# Patient Record
Sex: FEMALE | Race: WHITE | Employment: UNEMPLOYED | URBAN - METROPOLITAN AREA
[De-identification: names, ages, dates, MRNs, and addresses within clinical notes are randomized per-mention and may not be internally consistent; named-entity substitution may affect disease eponyms.]

---

## 2019-05-22 ENCOUNTER — APPOINTMENT (OUTPATIENT)
Dept: CT IMAGING | Age: 84
End: 2019-05-22
Payer: MEDICARE

## 2019-05-22 ENCOUNTER — APPOINTMENT (OUTPATIENT)
Dept: GENERAL RADIOLOGY | Age: 84
End: 2019-05-22
Payer: MEDICARE

## 2019-05-22 ENCOUNTER — HOSPITAL ENCOUNTER (EMERGENCY)
Age: 84
Discharge: NURSING, REHAB OR LONG-TERM CARE | End: 2019-05-22
Payer: MEDICARE

## 2019-05-22 VITALS
WEIGHT: 144.18 LBS | TEMPERATURE: 98.5 F | SYSTOLIC BLOOD PRESSURE: 160 MMHG | OXYGEN SATURATION: 96 % | RESPIRATION RATE: 11 BRPM | DIASTOLIC BLOOD PRESSURE: 74 MMHG | BODY MASS INDEX: 23.99 KG/M2 | HEART RATE: 68 BPM

## 2019-05-22 DIAGNOSIS — S09.90XA CLOSED HEAD INJURY, INITIAL ENCOUNTER: ICD-10-CM

## 2019-05-22 DIAGNOSIS — S05.11XA ORBITAL CONTUSION, RIGHT, INITIAL ENCOUNTER: ICD-10-CM

## 2019-05-22 DIAGNOSIS — N39.0 URINARY TRACT INFECTION WITH HEMATURIA, SITE UNSPECIFIED: ICD-10-CM
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
## 2019-05-22 DIAGNOSIS — R31.9 URINARY TRACT INFECTION WITH HEMATURIA, SITE UNSPECIFIED: ICD-10-CM

## 2019-05-22 LAB
A/G RATIO: 0.9 (ref 1.1–2.2)
ALBUMIN SERPL-MCNC: 3.4 G/DL (ref 3.4–5)
ALP BLD-CCNC: 116 U/L (ref 40–129)
ALT SERPL-CCNC: 24 U/L (ref 10–40)
ANION GAP SERPL CALCULATED.3IONS-SCNC: 8 MMOL/L (ref 3–16)
AST SERPL-CCNC: 23 U/L (ref 15–37)
BACTERIA: ABNORMAL /HPF
BASOPHILS ABSOLUTE: 0 K/UL (ref 0–0.2)
BASOPHILS RELATIVE PERCENT: 0.4 %
BILIRUB SERPL-MCNC: 0.3 MG/DL (ref 0–1)
BILIRUBIN URINE: NEGATIVE
BLOOD, URINE: ABNORMAL
BUN BLDV-MCNC: 33 MG/DL (ref 7–20)
CALCIUM SERPL-MCNC: 10.1 MG/DL (ref 8.3–10.6)
CHLORIDE BLD-SCNC: 107 MMOL/L (ref 99–110)
CLARITY: ABNORMAL
CO2: 30 MMOL/L (ref 21–32)
COLOR: YELLOW
COMMENT UA: ABNORMAL
CREAT SERPL-MCNC: 0.8 MG/DL (ref 0.6–1.2)
EOSINOPHILS ABSOLUTE: 0.1 K/UL (ref 0–0.6)
EOSINOPHILS RELATIVE PERCENT: 2 %
EPITHELIAL CELLS, UA: 0 /HPF (ref 0–5)
GFR AFRICAN AMERICAN: >60
GFR NON-AFRICAN AMERICAN: >60
GLOBULIN: 3.8 G/DL
GLUCOSE BLD-MCNC: 81 MG/DL (ref 70–99)
GLUCOSE URINE: NEGATIVE MG/DL
HCT VFR BLD CALC: 33.5 % (ref 36–48)
HEMOGLOBIN: 11 G/DL (ref 12–16)
HYALINE CASTS: 1 /LPF (ref 0–8)
KETONES, URINE: NEGATIVE MG/DL
LEUKOCYTE ESTERASE, URINE: ABNORMAL
LYMPHOCYTES ABSOLUTE: 0.7 K/UL (ref 1–5.1)
LYMPHOCYTES RELATIVE PERCENT: 12.2 %
MCH RBC QN AUTO: 29.9 PG (ref 26–34)
MCHC RBC AUTO-ENTMCNC: 32.8 G/DL (ref 31–36)
MCV RBC AUTO: 90.9 FL (ref 80–100)
MICROSCOPIC EXAMINATION: YES
MONOCYTES ABSOLUTE: 0.6 K/UL (ref 0–1.3)
MONOCYTES RELATIVE PERCENT: 11.5 %
NEUTROPHILS ABSOLUTE: 4.1 K/UL (ref 1.7–7.7)
NEUTROPHILS RELATIVE PERCENT: 73.9 %
NITRITE, URINE: POSITIVE
PDW BLD-RTO: 17.9 % (ref 12.4–15.4)
PH UA: 6.5 (ref 5–8)
PLATELET # BLD: 141 K/UL (ref 135–450)
PMV BLD AUTO: 8.4 FL (ref 5–10.5)
POTASSIUM SERPL-SCNC: 4.6 MMOL/L (ref 3.5–5.1)
PROTEIN UA: ABNORMAL MG/DL
RBC # BLD: 3.69 M/UL (ref 4–5.2)
RBC UA: 32 /HPF (ref 0–4)
SODIUM BLD-SCNC: 145 MMOL/L (ref 136–145)
SPECIFIC GRAVITY UA: 1.02 (ref 1–1.03)
TOTAL PROTEIN: 7.2 G/DL (ref 6.4–8.2)
URINE REFLEX TO CULTURE: YES
URINE TYPE: ABNORMAL
UROBILINOGEN, URINE: 0.2 E.U./DL
WBC # BLD: 5.5 K/UL (ref 4–11)
WBC UA: 272 /HPF (ref 0–5)

## 2019-05-22 PROCEDURE — 73030 X-RAY EXAM OF SHOULDER: CPT

## 2019-05-22 PROCEDURE — 85025 COMPLETE CBC W/AUTO DIFF WBC: CPT

## 2019-05-22 PROCEDURE — 87086 URINE CULTURE/COLONY COUNT: CPT

## 2019-05-22 PROCEDURE — 72125 CT NECK SPINE W/O DYE: CPT

## 2019-05-22 PROCEDURE — 70450 CT HEAD/BRAIN W/O DYE: CPT

## 2019-05-22 PROCEDURE — 87077 CULTURE AEROBIC IDENTIFY: CPT

## 2019-05-22 PROCEDURE — 81001 URINALYSIS AUTO W/SCOPE: CPT

## 2019-05-22 PROCEDURE — 99284 EMERGENCY DEPT VISIT MOD MDM: CPT

## 2019-05-22 PROCEDURE — 70480 CT ORBIT/EAR/FOSSA W/O DYE: CPT

## 2019-05-22 PROCEDURE — 87186 SC STD MICRODIL/AGAR DIL: CPT

## 2019-05-22 PROCEDURE — 80053 COMPREHEN METABOLIC PANEL: CPT

## 2019-05-22 PROCEDURE — 73502 X-RAY EXAM HIP UNI 2-3 VIEWS: CPT

## 2019-05-22 RX ORDER — CHOLECALCIFEROL (VITAMIN D3) 1250 MCG
50000 CAPSULE ORAL
COMMUNITY

## 2019-05-22 RX ORDER — SULFAMETHOXAZOLE AND TRIMETHOPRIM 800; 160 MG/1; MG/1
1 TABLET ORAL 2 TIMES DAILY
Qty: 10 TABLET | Refills: 0 | Status: SHIPPED | OUTPATIENT
Start: 2019-05-22 | End: 2019-05-27

## 2019-05-22 RX ORDER — ACETAMINOPHEN 325 MG/1
650 TABLET ORAL 2 TIMES DAILY PRN
COMMUNITY

## 2019-05-22 RX ORDER — POLYETHYLENE GLYCOL 3350 17 G/17G
17 POWDER, FOR SOLUTION ORAL DAILY PRN
COMMUNITY

## 2019-05-22 RX ORDER — AMMONIUM LACTATE 12 G/100G
LOTION TOPICAL PRN
COMMUNITY

## 2019-05-22 RX ORDER — POLYETHYLENE GLYCOL 3350 17 G/17G
17 POWDER, FOR SOLUTION ORAL
COMMUNITY

## 2019-05-22 RX ORDER — AMOXICILLIN 250 MG
2 CAPSULE ORAL NIGHTLY
COMMUNITY

## 2019-05-22 RX ORDER — ASPIRIN 81 MG/1
81 TABLET, CHEWABLE ORAL DAILY
COMMUNITY

## 2019-05-22 RX ORDER — ACETAMINOPHEN 325 MG/1
650 TABLET ORAL 4 TIMES DAILY
COMMUNITY

## 2019-05-22 RX ORDER — ATORVASTATIN CALCIUM 10 MG/1
10 TABLET, FILM COATED ORAL NIGHTLY
COMMUNITY

## 2019-05-22 ASSESSMENT — PAIN DESCRIPTION - DESCRIPTORS
DESCRIPTORS: CONSTANT
DESCRIPTORS: CONSTANT

## 2019-05-22 ASSESSMENT — PAIN DESCRIPTION - ORIENTATION
ORIENTATION: RIGHT
ORIENTATION: RIGHT

## 2019-05-22 ASSESSMENT — ENCOUNTER SYMPTOMS
EYE DISCHARGE: 0
VOMITING: 0
APNEA: 0
BACK PAIN: 0
SHORTNESS OF BREATH: 0
CHOKING: 0
FACIAL SWELLING: 0
NAUSEA: 0
EYE REDNESS: 0
SORE THROAT: 0
ABDOMINAL PAIN: 0

## 2019-05-22 ASSESSMENT — PAIN - FUNCTIONAL ASSESSMENT: PAIN_FUNCTIONAL_ASSESSMENT: ACTIVITIES ARE NOT PREVENTED

## 2019-05-22 ASSESSMENT — PAIN DESCRIPTION - FREQUENCY
FREQUENCY: CONTINUOUS
FREQUENCY: CONTINUOUS

## 2019-05-22 ASSESSMENT — PAIN SCALES - GENERAL
PAINLEVEL_OUTOF10: 2
PAINLEVEL_OUTOF10: 5

## 2019-05-22 ASSESSMENT — PAIN DESCRIPTION - LOCATION
LOCATION: EYE
LOCATION: EYE

## 2019-05-22 ASSESSMENT — PAIN DESCRIPTION - ONSET: ONSET: SUDDEN

## 2019-05-22 ASSESSMENT — PAIN DESCRIPTION - PAIN TYPE
TYPE: ACUTE PAIN
TYPE: ACUTE PAIN

## 2019-05-22 ASSESSMENT — PAIN DESCRIPTION - PROGRESSION
CLINICAL_PROGRESSION: GRADUALLY WORSENING
CLINICAL_PROGRESSION: RAPIDLY IMPROVING
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
## 2019-05-22 NOTE — ED NOTES
Bed: B-10  Expected date:   Expected time:   Means of arrival: [facility]  Comments:  89F fall from wheelchair     Marisue Riddles, YUNIOR  05/22/19 7309

## 2019-05-24 LAB
ORGANISM: ABNORMAL
URINE CULTURE, ROUTINE: ABNORMAL
URINE CULTURE, ROUTINE: ABNORMAL

## 2020-11-12 ENCOUNTER — HOSPITAL ENCOUNTER (EMERGENCY)
Age: 85
Discharge: NURSING, REHAB OR LONG-TERM CARE | End: 2020-11-12
Attending: EMERGENCY MEDICINE
Payer: MEDICARE

## 2020-11-12 VITALS
HEART RATE: 97 BPM | TEMPERATURE: 97 F | OXYGEN SATURATION: 95 % | BODY MASS INDEX: 23.22 KG/M2 | HEIGHT: 67 IN | WEIGHT: 147.93 LBS | DIASTOLIC BLOOD PRESSURE: 60 MMHG | RESPIRATION RATE: 20 BRPM | SYSTOLIC BLOOD PRESSURE: 102 MMHG

## 2020-11-12 PROCEDURE — 99283 EMERGENCY DEPT VISIT LOW MDM: CPT

## 2020-11-12 NOTE — ED NOTES
Vaseline guaze placed on left cheek and covered with 4x4. Wrapped with Willene Coins. Patient tolerated well.        Tricia Andrews RN  11/12/20 5634

## 2020-11-12 NOTE — ED PROVIDER NOTES
[address] ENCOUNTER      Pt Name: Ryan Gordon  MRN: [id number]  [place] 1/12/1928  Date of evaluation: 11/12/2020  Provider: Mali Mcdonough MD    CHIEF COMPLAINT     No chief complaint on file. HISTORY OF PRESENT ILLNESS   (Location/Symptom, Timing/Onset,Context/Setting, Quality, Duration, Modifying Factors, Severity)  Note limiting factors. Ryan Gordon is a 80 y.o. female who presents to the emergency department from her nursing facility for a bleeding of a left cheek skin cancer. Patient is DNR, comfort care only at a nursing facility. She has a known skin cancer to the left cheek which she occasionally picks at. Today she was picking up the skin lesion and caused some bleeding which the nurse practitioner at the facility was unable to control therefore she was sent for evaluation. Patient has no other complaints at the time of arrival.     NursingNotes were reviewed. REVIEW OF SYSTEMS    (2-9 systems for level 4, 10 or more for level 5)       Constitutional: No fever or chills. Ear/Nose/Mouth/Throat: No nasal congestion. No sore throat. Bleeding left cheek skin cancer lesion  Respiratory: No cough, No shortness of breath, No sputum production. Cardiovascular: No chest pain. No palpitations. Gastrointestinal: No abdominal pain. No nausea or vomiting  Hematology/Lymphatics: No bleeding or bruising tendency. Integumentary: No rash. No abrasions. Neurologic: No headache. No focal numbness or weakness. PAST MEDICAL HISTORY     Past Medical History:   Diagnosis Date    Arthritis     ESBL (extended spectrum beta-lactamase) producing bacteria infection 05/22/2019    urine culture    High blood pressure          SURGICALHISTORY     No past surgical history on file.       CURRENT MEDICATIONS       Previous Medications    ACETAMINOPHEN (TYLENOL) 325 MG TABLET    Take 650 mg by mouth 4 times daily    ACETAMINOPHEN (TYLENOL) 325 MG TABLET Substance and Sexual Activity    Alcohol use: No    Drug use: No    Sexual activity: Not on file   Lifestyle    Physical activity     Days per week: Not on file     Minutes per session: Not on file    Stress: Not on file   Relationships    Social connections     Talks on phone: Not on file     Gets together: Not on file     Attends Islam service: Not on file     Active member of club or organization: Not on file     Attends meetings of clubs or organizations: Not on file     Relationship status: Not on file    Intimate partner violence     Fear of current or ex partner: Not on file     Emotionally abused: Not on file     Physically abused: Not on file     Forced sexual activity: Not on file   Other Topics Concern    Not on file   Social History Narrative    Not on file       SCREENINGS             PHYSICAL EXAM    (up to 7 for level 4, 8 or more for level 5)     ED Triage Vitals   BP Temp Temp src Pulse Resp SpO2 Height Weight   -- -- -- -- -- -- -- --       General: Alert and oriented, No acute distress. Eye: Normal conjunctiva. Pupils equal and reactive. HENT: Oral mucosa is moist.  There is an exophytic mass to the left cheek with surrounding scaling tissue, there is bright red blood on the anterior aspect of the lesion, at approximately the 9 o'clock position, controlled at the time of arrival.  Respiratory: Lungs are clear to auscultation, Respirations are non-labored. Cardiovascular: Normal rate, Regular rhythm. Gastrointestinal: Soft, Non-tender, Non-distended. Musculoskeletal: No swelling. Integumentary: Warm, Dry. Neurologic: Alert, Oriented, No focal defects. Psychiatric: Cooperative.     DIAGNOSTIC RESULTS     EKG: All EKG's are interpreted by the Emergency Department Physician who either signs or Co-signsthis chart in the absence of a cardiologist.        RADIOLOGY:   Non-plain filmimages such as CT, Ultrasound and MRI are read by the radiologist. Plain radiographic images are visualized and preliminarily interpreted by the emergency physician with the below findings:      Interpretation per the Radiologist below, if available at the time ofthis note:    No orders to display         ED BEDSIDE ULTRASOUND:   Performed by ED Physician - none    LABS:  Labs Reviewed - No data to display    All other labs were within normal range or not returned as of this dictation. EMERGENCY DEPARTMENT COURSE and DIFFERENTIAL DIAGNOSIS/MDM:   Vitals: There were no vitals filed for this visit. Medical decision making: This is a 30-year-old female who presents for evaluation of bleeding from a known left cheek skin carcinoma. On exam patient is well-appearing, afebrile, with normal vital signs. She has an exophytic mass to the left cheek consistent with this known skin carcinoma. A bulky dressing was in place at the time of patient's arrival to the emergency department, this was taken down there is some bright red blood along the anterior aspect of this as described above, but no active bleeding at the time of my evaluation. Dressing was replaced with a smaller dressing with underlying layer of Vaseline gauze to avoid rebleeding with removal.  Otherwise, patient stable for discharge back to her nursing facility. CRITICAL CARE TIME   Total Critical Care time was 0 minutes, excluding separately reportable procedures. There was a high probability of clinically significant/life threatening deterioration in the patient's condition which required my urgent intervention. CONSULTS:  None    PROCEDURES:  Unless otherwise noted below, none         FINAL IMPRESSION      1.  Bleeding pigmented skin lesion          DISPOSITION/PLAN   DISPOSITION Decision To Discharge 11/12/2020 12:22:14 PM      PATIENT REFERRED TO:  [address]  [address] [place] [address]  [phone]    Schedule an appointment as soon as possible for a visit in 2 days        DISCHARGE MEDICATIONS:  New Prescriptions    No medications on file          (Please note that portions of this note were completed with a voice recognition program.Efforts were made to edit the dictations but occasionally words are mis-transcribed.)    Kings Villa MD (electronically signed)  Attending Emergency Physician        Kings Villa MD  11/12/20 2799

## 2020-11-12 NOTE — ED TRIAGE NOTES
Patient came to ER with complaints of left cheek bleeding from skin cancer area. Patient was picking at area. Blood on front of sweatshirt. Blood on fingers bilateral hands and nails.

## 2020-11-12 NOTE — ED NOTES
Dr. Morteza Raymond into see patient. No bleeding from left cheek area at this time.        Patience Montana RN  11/12/20 5937

## 2020-11-12 NOTE — ED NOTES
Report called to [facility]. Dr. Jonah Weeks spoke to RN practitioner. Supplies sent with patient. Patient transported via [address]        Lyubov Khalil RN  11/12/20 9646

## 2021-07-29 ENCOUNTER — HOSPITAL ENCOUNTER (EMERGENCY)
Age: 86
Discharge: HOME | End: 2021-07-29
Attending: EMERGENCY MEDICINE
Payer: MEDICARE

## 2021-07-29 VITALS
OXYGEN SATURATION: 95 % | TEMPERATURE: 97.3 F | HEART RATE: 73 BPM | WEIGHT: 120.59 LBS | DIASTOLIC BLOOD PRESSURE: 78 MMHG | RESPIRATION RATE: 16 BRPM | SYSTOLIC BLOOD PRESSURE: 120 MMHG | BODY MASS INDEX: 18.89 KG/M2

## 2021-07-29 DIAGNOSIS — S01.80XA OPEN WOUND OF FACE, INITIAL ENCOUNTER: Primary | ICD-10-CM

## 2021-07-29 DIAGNOSIS — M79.89 MASS OF SOFT TISSUE OF FACE: ICD-10-CM

## 2021-07-29 LAB
HCT VFR BLD CALC: 29.9 % (ref 36–48)
HEMOGLOBIN: 9.5 G/DL (ref 12–16)
MCH RBC QN AUTO: 30.8 PG (ref 26–34)
MCHC RBC AUTO-ENTMCNC: 31.8 G/DL (ref 31–36)
MCV RBC AUTO: 96.8 FL (ref 80–100)
PDW BLD-RTO: 15.5 % (ref 12.4–15.4)
PLATELET # BLD: 116 K/UL (ref 135–450)
PMV BLD AUTO: 9.1 FL (ref 5–10.5)
RBC # BLD: 3.08 M/UL (ref 4–5.2)
WBC # BLD: 7.2 K/UL (ref 4–11)

## 2021-07-29 PROCEDURE — 99285 EMERGENCY DEPT VISIT HI MDM: CPT

## 2021-07-29 PROCEDURE — 36415 COLL VENOUS BLD VENIPUNCTURE: CPT

## 2021-07-29 PROCEDURE — 85027 COMPLETE CBC AUTOMATED: CPT

## 2021-07-29 NOTE — ED NOTES
Dr. Ramirez Boys at bedside to speak with son and discuss lab results and discharge plan of care.         Arlene Garcia RN  07/29/21 7674

## 2021-07-29 NOTE — ED TRIAGE NOTES
Patient presents by [facility] with c/o bleeding from face. Patient has a tumor on the left side of her face and stated that she has picked a scab off of it twice in 24 hours. Staff reports that both times there was a lot of bleeding. They state that a pressure dressing was applied yesterday and appeared to be working, however she removed it and picked the scab again. They state patient had so much blood she was given a shower prior to transport to the ER. The patient is awake, alert, able to answer basic questions about herself, when asked what happened to her face, patient is unable to answer. Respirations easy & regular, skin w/d, MMM & pink, cap refill brisk. Dr. Gage Hart in room, silver nitrate sticks to bedside. Area on patient's left cheek with steady stream of venous blood from middle of tumor. ABD pad that was removed was half saturated with blood. Patient denies pain.

## 2021-07-29 NOTE — ED NOTES
Patient resting comfortably. No complaints of pain. Dry dressing placed on left cheek area. Patient tolerated well. Updated son with approximate arrival of 0530 for transportation.        Carson Iraheta RN  07/29/21 0009

## 2021-07-29 NOTE — ED NOTES
Report given to strategic staff. Discharge instructions reviewed with patient son. Patient son verbalized understanding. Patient return back to [facility].        Lyubov Khalil RN  07/29/21 8618

## 2021-07-29 NOTE — ED PROVIDER NOTES
[facility]  eMERGENCY dEPARTMENT eNCOUnter      Pt Name: Dimas Whiting  MRN: [id number]  [place] 1/12/1928  Date of evaluation: 7/29/2021  Provider: Vito Martel MD    CHIEF COMPLAINT       Chief Complaint   Patient presents with    Other     Patient has large tumor on left side of face. She has picked it making it bleed twice in two days. Reported by nursing home that patient lost a large amount of blood. HISTORY OF PRESENT ILLNESS  (Location/Symptom, Timing/Onset, Context/Setting, Quality, Duration, Modifying Factors, Severity.)   Dimas Whiting is a 80 y.o. female who presents to the emergency department with bleeding from mass on her face. This patient comes from a nursing home. She has a mass in the left side of her face in front of her ear. Apparently it bled last night and they put a dressing on it. It bled again this evening and he put a dressing on it would not stop so they called EMS. Nursing Notes were reviewed and I agree. REVIEW OF SYSTEMS    (2-9 systems for level 4, 10 or more for level 5)     HEENT: Left facial mass which is bleeding. Cardiovascular: Negative. Pulmonary: No shortness of breath. GI: No abdominal pain nausea vomiting. Neuro: No dizziness. Except as noted above the remainder of the review of systems was reviewed and negative. PAST MEDICAL HISTORY         Diagnosis Date    Arthritis     ESBL (extended spectrum beta-lactamase) producing bacteria infection 05/22/2019    urine culture    High blood pressure        SURGICAL HISTORY     History reviewed. No pertinent surgical history.     CURRENT MEDICATIONS       Previous Medications    ACETAMINOPHEN (TYLENOL) 325 MG TABLET    Take 650 mg by mouth 4 times daily    ACETAMINOPHEN (TYLENOL) 325 MG TABLET    Take 650 mg by mouth 2 times daily as needed for Pain    AMMONIUM LACTATE (LAC-HYDRIN) 12 % LOTION    Apply topically as needed for Dry Skin (to face) Apply topically as needed. ASPIRIN 81 MG CHEWABLE TABLET    Take 81 mg by mouth daily    ATENOLOL (TENORMIN) 25 MG TABLET    Take 25 mg by mouth daily     ATORVASTATIN (LIPITOR) 10 MG TABLET    Take 10 mg by mouth nightly    CHOLECALCIFEROL (VITAMIN D3) 59331 UNITS CAPS    Take 50,000 Units by mouth every 30 days  of the month    GABAPENTIN (NEURONTIN) 600 MG TABLET    Take 600 mg by mouth 2 times daily. MICONAZOLE (MICOTIN) 2 % POWDER    Apply topically 2 times daily Apply topically 2 times daily. Under right breast    PETROLATUM-ZINC OXIDE (PHYTOPLEX Z-GUARD) 57-17 % PSTE    Apply topically 2 times daily To coccyx    POLYETHYLENE GLYCOL (GLYCOLAX) PACKET    Take 17 g by mouth daily as needed for Constipation    POLYETHYLENE GLYCOL (GLYCOLAX) PACKET    Take 17 g by mouth three times a week Mon, , Friday    SENNA-DOCUSATE (PERICOLACE) 8.6-50 MG PER TABLET    Take 2 tablets by mouth nightly    SERTRALINE (ZOLOFT) 25 MG TABLET    Take 12.5 mg by mouth daily        ALLERGIES     Morphine and related    FAMILY HISTORY           Problem Relation Age of Onset    Arthritis Mother     Cancer Father     Asthma Sister      Family Status   Relation Name Status    Mother      Father      Sister  (Not Specified)        SOCIAL HISTORY      reports that she quit smoking about 51 years ago. She has never used smokeless tobacco. She reports that she does not drink alcohol and does not use drugs. PHYSICAL EXAM    (up to 7 for level 4, 8 or more for level 5)     ED Triage Vitals   BP Temp Temp src Pulse Resp SpO2 Height Weight   -- -- -- -- -- -- -- --       General: Alert elderly female no acute distress. Head: Atraumatic and normocephalic. Eyes: No conjunctival injection. No pallor. Pupils equal round reactive. ENT: Nose clear. Oropharynx negative there is an approximately 3 x 5 cm mass on the left side of the face anterior to the ear as pictured below.  There is an approximately 1 cm excoriated area on the posterior inferior aspect of the mass which is excoriated and oozing. Neck: Supple, nontender. Heart: Regular rate and rhythm. No murmurs or gallops noted. Lungs: Breath sounds equal bilaterally and clear. Abdomen: Soft, nondistended, nontender. Neuro: Awake, alert, oriented. No focal motor deficits. DIAGNOSTIC RESULTS     RADIOLOGY:   Non-plain film images such as CT, Ultrasound and MRI are read by the radiologist. [facility] radiographic images are visualized and preliminarily interpreted by Daisy Post MD with the below findings:      Interpretation per the Radiologist below, if available at the time of this note:    No orders to display       LABS:  Labs Reviewed   CBC - Abnormal; Notable for the following components:       Result Value    RBC 3.08 (*)     Hemoglobin 9.5 (*)     Hematocrit 29.9 (*)     RDW 15.5 (*)     Platelets 221 (*)     All other components within normal limits    Narrative:     Performed at:  [address]   Phone [phone]       All other labs were within normal range or not returned as of this dictation. EMERGENCY DEPARTMENT COURSE and DIFFERENTIAL DIAGNOSIS/MDM:   Vitals:    Vitals:    07/29/21 0349   BP: 138/72   Pulse: 63   Resp: 18   Temp: 97.3 °F (36.3 °C)   TempSrc: Oral   Weight: 120 lb 9.5 oz (54.7 kg)       Patient has a necrotic tumor/soft tissue mass on her left face. Apparently it bled yesterday and bled again this evening. They put a dressing on it. There was a excoriated area on the posterior inferior part of the mass that was oozing. It was cauterized with silver nitrate. Good hemostasis. Her H&H is 9.5 and 29.9. A dressing will be placed. It will be left in place for 48 hours. She can be followed at the nursing home to make further determination on how they want to deal with his facial mass. The son is now arrived. I spoke with him.   She is actually scheduled to have surgery on August 10 for removal of the soft tissue mass/tumor. PROCEDURES:  The bleeding area on the right facial mass was cleaned with Hibiclens and saline. Silver nitrate cautery was used to cauterize the oozing area. Good hemostasis and no further bleeding was noted after cauterization. See pictures above post cauterization. FINAL IMPRESSION      1. Open wound of face, initial encounter    2.  Mass of soft tissue of face          DISPOSITION/PLAN   DISPOSITION Decision To Discharge 07/29/2021 04:12:19 AM      PATIENT REFERRED TO:  [address]  [facility] 2  [phone]      As needed      DISCHARGE MEDICATIONS:  New Prescriptions    No medications on file       (Please note that portions of this note were completed with a voice recognition program.  Efforts were made to edit the dictations but occasionally words are mis-transcribed.)    Александр Gutierrez MD  Attending Emergency Physician        Mk Zavala MD  07/29/21 [id number] Karie Matute MD  07/29/21 4500

## 2021-07-29 NOTE — ED NOTES
3 attempts made to call report to FLAKO Ratliff. Attempted to call charge nurse. No answer.      Joyce Esparza, RN  07/29/21 2025

## 2021-08-01 ENCOUNTER — HOSPITAL ENCOUNTER (EMERGENCY)
Age: 86
Discharge: HOME | End: 2021-08-01
Attending: EMERGENCY MEDICINE
Payer: MEDICARE

## 2021-08-01 VITALS
RESPIRATION RATE: 16 BRPM | HEART RATE: 70 BPM | BODY MASS INDEX: 20 KG/M2 | HEIGHT: 67 IN | WEIGHT: 127.43 LBS | OXYGEN SATURATION: 97 % | SYSTOLIC BLOOD PRESSURE: 93 MMHG | DIASTOLIC BLOOD PRESSURE: 58 MMHG | TEMPERATURE: 97 F

## 2021-08-01 DIAGNOSIS — M79.89 MASS OF SOFT TISSUE OF FACE: ICD-10-CM

## 2021-08-01 DIAGNOSIS — S01.80XA OPEN WOUND OF FACE, INITIAL ENCOUNTER: Primary | ICD-10-CM

## 2021-08-01 PROCEDURE — 99284 EMERGENCY DEPT VISIT MOD MDM: CPT

## 2021-08-01 PROCEDURE — 12011 RPR F/E/E/N/L/M 2.5 CM/<: CPT

## 2021-08-01 PROCEDURE — 99285 EMERGENCY DEPT VISIT HI MDM: CPT

## 2021-08-01 NOTE — ED NOTES
Report called to Claudette nurse on [facility] at [facility]. Patient's dressing on head clean and dry.  Patient alert to name     Nancy Ibarra RN  08/01/21 9044

## 2021-08-01 NOTE — ED NOTES
[facility] to [facility] back to [facility]      Rafael Gayle, YUNIOR  08/01/21 0454

## 2021-08-01 NOTE — ED NOTES
Dr. Zakia Pineda removed old blood drainage. He cauterized area. . Patient awake, alert to name and placed. Confused to time.  She denies pain      Juan Gonzalez RN  08/01/21 9756

## 2021-08-01 NOTE — ED PROVIDER NOTES
[address] [facility]  eMERGENCY dEPARTMENT eNCOUnter      Pt Name: Axel Wang  MRN: [id number]  [place] 1/12/1928  Date of evaluation: 8/1/2021  Provider: Gary Bentley MD    CHIEF COMPLAINT       Chief Complaint   Patient presents with    Other     patient picked mass on left side of face area bled for 20 minutes. She is going to have the mass removed 8/10/21         HISTORY OF PRESENT ILLNESS  (Location/Symptom, Timing/Onset, Context/Setting, Quality, Duration, Modifying Factors, Severity.)   Axel Wang is a 80 y.o. female who presents to the emergency department with bleeding from the mass on her left side of her face. She was here 2 nights ago for the same. The mass was scheduled to be removed on August 10. She apparently was picking at it and it bled for about 20 minutes. Nursing Notes were reviewed and I agree. REVIEW OF SYSTEMS    (2-9 systems for level 4, 10 or more for level 5)     HEENT: Mass on the left face in front of her ear. Bleeding after being picked at. Except as noted above the remainder of the review of systems was reviewed and negative. PAST MEDICAL HISTORY         Diagnosis Date    Arthritis     Cancer [facility])     face    ESBL (extended spectrum beta-lactamase) producing bacteria infection 05/22/2019    urine culture    High blood pressure        SURGICAL HISTORY     History reviewed. No pertinent surgical history. CURRENT MEDICATIONS       Previous Medications    ACETAMINOPHEN (TYLENOL) 325 MG TABLET    Take 650 mg by mouth 4 times daily    ACETAMINOPHEN (TYLENOL) 325 MG TABLET    Take 650 mg by mouth 2 times daily as needed for Pain    AMMONIUM LACTATE (LAC-HYDRIN) 12 % LOTION    Apply topically as needed for Dry Skin (to face) Apply topically as needed.      ASPIRIN 81 MG CHEWABLE TABLET    Take 81 mg by mouth daily    ATENOLOL (TENORMIN) 25 MG TABLET    Take 25 mg by mouth daily     ATORVASTATIN (LIPITOR) 10 MG TABLET    Take 10 mg by mouth nightly    CHOLECALCIFEROL (VITAMIN D3) 70951 UNITS CAPS    Take 50,000 Units by mouth every 30 days 15 of the month    GABAPENTIN (NEURONTIN) 600 MG TABLET    Take 600 mg by mouth 2 times daily. MICONAZOLE (MICOTIN) 2 % POWDER    Apply topically 2 times daily Apply topically 2 times daily. Under right breast    PETROLATUM-ZINC OXIDE (PHYTOPLEX Z-GUARD) 57-17 % PSTE    Apply topically 2 times daily To coccyx    POLYETHYLENE GLYCOL (GLYCOLAX) PACKET    Take 17 g by mouth daily as needed for Constipation    POLYETHYLENE GLYCOL (GLYCOLAX) PACKET    Take 17 g by mouth three times a week Mon, , Friday    SENNA-DOCUSATE (PERICOLACE) 8.6-50 MG PER TABLET    Take 2 tablets by mouth nightly    SERTRALINE (ZOLOFT) 25 MG TABLET    Take 12.5 mg by mouth daily        ALLERGIES     Morphine and related    FAMILY HISTORY           Problem Relation Age of Onset    Arthritis Mother     Cancer Father     Asthma Sister      Family Status   Relation Name Status    Mother      Father      Sister  (Not Specified)        SOCIAL HISTORY      reports that she quit smoking about 51 years ago. She has never used smokeless tobacco. She reports that she does not drink alcohol and does not use drugs. PHYSICAL EXAM    (up to 7 for level 4, 8 or more for level 5)     ED Triage Vitals [21 0026]   BP Temp Temp Source Pulse Resp SpO2 Height Weight   99/60 97 °F (36.1 °C) Temporal 72 16 97 % 5' 7\" (1.702 m) 127 lb 6.8 oz (57.8 kg)       General: Alert thin elderly female in no obvious distress. Head: Atraumatic and normocephalic. Eyes: Pupils equal round reactive. No pallor. ENT: Approximately 4 x 6 cm mass on the left face just below and anterior to the ear. See picture below. There is a 1 cm excoriated area on the posterior inferior aspect which is oozing. Heart: Regular rate and rhythm. No murmurs or gallops noted. Lungs: Breath sounds equal bilaterally and clear.   Abdomen: Soft, nondistended, nontender. Neuro: Awake, alert, no focal motor deficits. DIAGNOSTIC RESULTS     RADIOLOGY:   Non-plain film images such as CT, Ultrasound and MRI are read by the radiologist. Plain radiographic images are visualized and preliminarily interpreted by Margret De Santiago MD with the below findings:      Interpretation per the Radiologist below, if available at the time of this note:    No orders to display       LABS:  Labs Reviewed - No data to display    All other labs were within normal range or not returned as of this dictation. EMERGENCY DEPARTMENT COURSE and DIFFERENTIAL DIAGNOSIS/MDM:   Vitals:    Vitals:    08/01/21 0026   BP: 99/60   Pulse: 72   Resp: 16   Temp: 97 °F (36.1 °C)   TempSrc: Temporal   SpO2: 97%   Weight: 127 lb 6.8 oz (57.8 kg)   Height: 5' 7\" (1.702 m)       This patient presents the second time in 2 days with bleeding from a mass on her face after picking at it. I used silver nitrate cautery and controlled the bleeding. The wound is clean and dry without any active signs of bleeding. She will be discharged back to the nursing home with a dressing in place with instructions to keep it covered so she does not pick at it again until her surgery in 9 days. PROCEDURES:  Cautery of facial mass  Area was cleaned with Hibiclens and saline. Silver nitrate cautery was utilized on the excoriated bleeding area with good hemostasis. She tolerated the procedure well without complications. FINAL IMPRESSION      1. Open wound of face, initial encounter    2.  Mass of soft tissue of face          DISPOSITION/PLAN   DISPOSITION Decision To Discharge 08/01/2021 12:43:23 AM      PATIENT REFERRED TO:  Nursing Home MD    In 1 day        DISCHARGE MEDICATIONS:  New Prescriptions    No medications on file       (Please note that portions of this note were completed with a voice recognition program.  Efforts were made to edit the dictations but occasionally words are mis-transcribed.)    Helena Irwin MD  Attending Emergency Physician        Cara Wright MD  08/01/21 8282

## 2021-08-01 NOTE — ED NOTES
Called [facility] ambulance for transfer back to [facility].  ETA 45 minutes      Berenice Villalba RN  08/01/21 0110

## 2021-09-03 ENCOUNTER — HOSPITAL ENCOUNTER (EMERGENCY)
Age: 86
Discharge: HOME | End: 2021-09-03
Payer: MEDICARE

## 2021-09-03 VITALS
SYSTOLIC BLOOD PRESSURE: 130 MMHG | RESPIRATION RATE: 11 BRPM | DIASTOLIC BLOOD PRESSURE: 68 MMHG | TEMPERATURE: 97.3 F | OXYGEN SATURATION: 98 % | HEART RATE: 61 BPM

## 2021-09-03 DIAGNOSIS — T14.8XXA BLEEDING FROM WOUND: Primary | ICD-10-CM

## 2021-09-03 LAB
EKG ATRIAL RATE: 375 BPM
EKG DIAGNOSIS: NORMAL
EKG Q-T INTERVAL: 480 MS
EKG QRS DURATION: 136 MS
EKG QTC CALCULATION (BAZETT): 487 MS
EKG R AXIS: 1 DEGREES
EKG T AXIS: -36 DEGREES
EKG VENTRICULAR RATE: 62 BPM

## 2021-09-03 PROCEDURE — 93010 ELECTROCARDIOGRAM REPORT: CPT | Performed by: INTERNAL MEDICINE

## 2021-09-03 PROCEDURE — 93005 ELECTROCARDIOGRAM TRACING: CPT | Performed by: EMERGENCY MEDICINE

## 2021-09-03 PROCEDURE — 99283 EMERGENCY DEPT VISIT LOW MDM: CPT

## 2021-09-03 NOTE — ED NOTES
surgifoam applied to area that is continuing to bleed and dressing applied over foam.      Venkata King RN  09/03/21 1625

## 2021-09-03 NOTE — ED NOTES
Blood clot cleaned from pts left eye with sterile water and gauze. Pt tolerated well.       Caitlin Ortez RN  09/03/21 2756

## 2021-09-03 NOTE — ED PROVIDER NOTES
[facility]      Pt Name: Matt Freedman  MRN: [id number]  [place] 1/12/1928  Date of evaluation: 9/3/2021  Provider: CAMI Whitney    This patient was not seen and evaluated by the attending physician No att. providers found. CHIEF COMPLAINT     \"Left eye\"      HISTORY OF PRESENT ILLNESS  (Location/Symptom, Timing/Onset, Context/Setting, Quality, Duration, Modifying Factors, Severity.)   Matt Freedman is a 80 y.o. female who presents to the emergency department for \"left eye\" per report from EMS from staff at [facility]. EMS reports they could not understand the nurse at the SNF, but nurse at the SNF just kept saying \"left eye\". Patient does not know what she is here. Says \"maybe fell\". Denies pain. Oriented to person and place. Likely has dementia. Denies eye pain or vision changes. History and ROS limited due to patient's dementia. Marisela, her RN here, has called the SNF 6 times and could not get an answer      Nursing Notes were reviewed and I agree. REVIEW OF SYSTEMS    (2-9 systems for level 4, 10 or more for level 5)     Review of Systems   Unable to perform ROS: Dementia     Except as noted above the remainder of the review of systems was reviewed and negative. PAST MEDICAL HISTORY         Diagnosis Date    Arthritis     Cancer [facility])     face    ESBL (extended spectrum beta-lactamase) producing bacteria infection 05/22/2019    urine culture    High blood pressure        SURGICAL HISTORY     History reviewed. No pertinent surgical history. CURRENT MEDICATIONS       Previous Medications    ACETAMINOPHEN (TYLENOL) 325 MG TABLET    Take 650 mg by mouth 4 times daily    ACETAMINOPHEN (TYLENOL) 325 MG TABLET    Take 650 mg by mouth 2 times daily as needed for Pain    AMMONIUM LACTATE (LAC-HYDRIN) 12 % LOTION    Apply topically as needed for Dry Skin (to face) Apply topically as needed.      ASPIRIN 81 MG CHEWABLE TABLET Take 81 mg by mouth daily    ATENOLOL (TENORMIN) 25 MG TABLET    Take 25 mg by mouth daily     ATORVASTATIN (LIPITOR) 10 MG TABLET    Take 10 mg by mouth nightly    CHOLECALCIFEROL (VITAMIN D3) 89362 UNITS CAPS    Take 50,000 Units by mouth every 30 days 15 of the month    GABAPENTIN (NEURONTIN) 600 MG TABLET    Take 600 mg by mouth 2 times daily. MICONAZOLE (MICOTIN) 2 % POWDER    Apply topically 2 times daily Apply topically 2 times daily. Under right breast    PETROLATUM-ZINC OXIDE (PHYTOPLEX Z-GUARD) 57-17 % PSTE    Apply topically 2 times daily To coccyx    POLYETHYLENE GLYCOL (GLYCOLAX) PACKET    Take 17 g by mouth daily as needed for Constipation    POLYETHYLENE GLYCOL (GLYCOLAX) PACKET    Take 17 g by mouth three times a week Mon, , Friday    SENNA-DOCUSATE (PERICOLACE) 8.6-50 MG PER TABLET    Take 2 tablets by mouth nightly    SERTRALINE (ZOLOFT) 25 MG TABLET    Take 12.5 mg by mouth daily        ALLERGIES     Codeine, Epoetin (isabelle), Morphine and related, Oxycodone, and Pork-derived products    FAMILY HISTORY           Problem Relation Age of Onset    Arthritis Mother     Cancer Father     Asthma Sister      Family Status   Relation Name Status    Mother      Father      Sister  (Not Specified)        SOCIAL HISTORY      reports that she quit smoking about 51 years ago. She has never used smokeless tobacco. She reports that she does not drink alcohol and does not use drugs. PHYSICAL EXAM    (up to 7 for level 4, 8 or more for level 5)     ED Triage Vitals   BP Temp Temp Source Pulse Resp SpO2 Height Weight   21 0556 21 0637 21 0637 21 0556 21 0556 21 0556 -- --   128/83 97.3 °F (36.3 °C) Temporal 60 16 97 %         Physical Exam  Constitutional:       General: She is not in acute distress. Appearance: She is ill-appearing (chronically). She is not toxic-appearing or diaphoretic. HENT:      Head: Normocephalic.    Eyes: Pupils: Pupils are equal, round, and reactive to light. Comments: Mild amount of ecchymosis and edema around the left eye. Well approximated incision on the left upper eyelid clean, dry, intact with small amount of dried blood medially. No active bleeding. Left eye does not completely close. Has some clear drainage. No purulent drainage. Cornea appears normal.  No hyphema or subconjunctival hemorrhage. Chronic appearing Wound on the lateral left face in front of the ear with no active bleeding   Neck:      Comments: No TTP cervical midline  Cardiovascular:      Rate and Rhythm: Normal rate. Rhythm irregular. Heart sounds: Normal heart sounds. Pulmonary:      Effort: Pulmonary effort is normal. No respiratory distress. Breath sounds: Normal breath sounds. Abdominal:      General: There is no distension. Palpations: Abdomen is soft. There is no mass. Tenderness: There is no abdominal tenderness. There is no guarding or rebound. Hernia: No hernia is present. Musculoskeletal:      Cervical back: Normal range of motion and neck supple. Comments: No TTP bilateral hips   Skin:     General: Skin is warm. Neurological:      Mental Status: She is alert. DIFFERENTIAL DIAGNOSIS   Bleeding from wound?, head injury?, other? DIAGNOSTICRESULTS     EKG: All EKG's are interpreted by CAMI Tobin in the absence of a cardiologist.    Jose Steele obtained.   See Dr. Lina Pham note for interpretation    RADIOLOGY:   Non-plain film images such as CT, Ultrasound and MRI are read by the radiologist. Júniors Najjar radiographic images are visualized and preliminarily interpreted by CAMI Tobin with the below findings:      Interpretation per the Radiologist below, if available at the time of this note:    No orders to display         LABS:  Results for orders placed or performed during the hospital encounter of 09/03/21   EKG 12 Lead   Result Value Ref Range    Ventricular Rate 62 BPM    Atrial Rate 375 BPM    QRS Duration 136 ms    Q-T Interval 480 ms    QTc Calculation (Bazett) 487 ms    R Axis 1 degrees    T Axis -36 degrees    Diagnosis       Wide QRS rhythm with Premature supraventricular complexesRight bundle branch blockAbnormal ECGWhen compared with ECG of 04-FEB-2010 07:10,Wide QRS rhythm has replaced Sinus rhythm       All other labs were withinnormal range or not returned as of this dictation. EMERGENCY DEPARTMENT COURSE and DIFFERENTIAL DIAGNOSIS/MDM:   Vitals:    Vitals:    09/03/21 0556 09/03/21 0637 09/03/21 0641   BP: 128/83  124/74   Pulse: 60  56   Resp: 16  12   Temp:  97.3 °F (36.3 °C)    TempSrc:  Temporal    SpO2: 97%  98%       Patient was nontoxic, chronically ill appearing, afebrile with normal vital signs. Saturating well on room air. I reviewed Care Everywhere. Yesterday, she had surgery on her left upper and lower eyelid and debridement of left neck wound at . Corinna Howard, son, presented to bedside and said he was called around 5a by SNF and told that eye was bleeding so was being sent to ER. Corinna Howard reports patient had mass removed from the left face and since then, left eyelids will not close now so she had surgery yesterday to put a weight in her eyelid so it will close. Surgeon was happy with how procedure went and canceled in person post op appt and planning to do phone call or video visit with SNF. While here, Corinna Howard called the SNF to get more of the history for us. He reports patient was found in bed with bleeding from left eye. No head injury. RN will clean wound. After wound was cleaned, there was a very small area medially on the incision that is oozing a small amount of blood. Surgifoam and dressing will be applied. Discussed with Corinna Howard. Believe she is appropriate for outpatient management. Return for worsening. PROCEDURES:  None    FINAL IMPRESSION      1.  Bleeding from wound          DISPOSITION/PLAN   DISPOSITION Decision To Discharge 09/03/2021 07:50:02 AM      PATIENT REFERRED TO:  [facility] Emergency Department  [address]  [phone]    As needed, If symptoms worsen      DISCHARGE MEDICATIONS:  New Prescriptions    No medications on file       (Please note that portions of this note werecompleted with a voice recognition program.  Efforts were made to edit the dictations but occasionally words are mis-transcribed.)    Denise Donald, [address] [place] [address]  09/03/21 5276

## 2021-09-03 NOTE — ED NOTES
Awaiting ems transport to take pt back to nursing home, pts family at bedside.       Barbie Malcolm, YUNIOR  09/03/21 2700

## 2021-09-03 NOTE — ED TRIAGE NOTES
Patient presents to ED via EMS. Per EMS, unable to understand nurse at nursing home as to why patient was being sent to the hospital, just kept stating \"hey eye. \" Patient A&O x3. Patient able to state name, , where she is and situation. Patient denies pain to her left eye and face. Patient asked if she knows why she came to the hospital and patient states no. Patient requested blanket due to being cold. Patient voiced to this nurse that she just had surgery on her face and eye. Tried calling nursing home multiple times with no answer.

## 2021-09-03 NOTE — ED PROVIDER NOTES
Attending Note:    The patient was seen and examined by the mid-level provider. I was asked to provide EKG interpretation only by the midlevel provider. DIAGNOSTIC RESULTS     EKG: All EKG's are interpreted by the Emergency Department Physician who either signs or Co-signs this chart in the absence of a cardiologist.    Atrial fibrillation with controlled ventricular response. Rate 62. QRS duration 136 ms. QTc 487 ms. Low voltage. Right bundle branch block. No ST elevation. (Please note that portions of this note were completed with a voice recognition program.  Efforts were made to edit the dictations but occasionally words are mis-transcribed. )    Nisreen Ruiz DO (electronically signed)  Attending Emergency Physician       Carolynn Ellison DO  09/03/21 6869